# Patient Record
Sex: MALE | Race: WHITE
[De-identification: names, ages, dates, MRNs, and addresses within clinical notes are randomized per-mention and may not be internally consistent; named-entity substitution may affect disease eponyms.]

---

## 2018-06-28 NOTE — EMERGENCY ROOM REPORT
History of Present Illness


General


Chief Complaint:  Dizziness


Source:  Patient





Present Illness


HPI


68-year-old male presents ED complaining of dizziness and hot flashes.  Started 

today at home.  Call 911.  Upon arrival patient states he is feeling somewhat 

better.  Notes history of prostate cancer.  Notes history of anxiety and 

hypertension.  States he could've been anxious.  Denies chest pain or shortness 

of breath.  Denies any headaches blurry vision or nausea or vomiting.  No other 

aggravating relieving factors.  Denies any other associated symptoms


Allergies:  


Coded Allergies:  


     No Known Allergies (Unverified , 6/28/18)





Patient History


Past Medical History:  HTN, psych hx, other - prostate cancer


Past Surgical History:  none


Pertinent Family History:  none


Social History:  Denies: smoking, alcohol use, drug use


Immunizations:  UTD


Reviewed Nursing Documentation:  PMH: Agreed; PSxH: Agreed





Nursing Documentation-PMH


Past Medical History:  No History, Except For


Hx Hypertension:  Yes


Hx Cancer:  Yes - Prostate


History Of Psychiatric Problem:  Yes - Anxiety





Review of Systems


All Other Systems:  negative except mentioned in HPI





Physical Exam





Vital Signs








  Date Time  Temp Pulse Resp B/P (MAP) Pulse Ox O2 Delivery O2 Flow Rate FiO2


 


6/28/18 12:53 97.9 65 18 137/78 99 Room Air  





 97.9       








Sp02 EP Interpretation:  reviewed, normal


General Appearance:  no apparent distress, alert, GCS 15, non-toxic


Head:  normocephalic, atraumatic


Eyes:  bilateral eye normal inspection, bilateral eye PERRL


ENT:  hearing grossly normal, normal pharynx, no angioedema, normal voice


Neck:  full range of motion, supple/symm/no masses


Respiratory:  chest non-tender, lungs clear, normal breath sounds, speaking 

full sentences


Cardiovascular #1:  regular rate, rhythm, no edema


Cardiovascular #2:  2+ carotid (R), 2+ carotid (L), 2+ radial (R), 2+ radial (L)

, 2+ dorsalis pedis (R), 2+ dorsalis pedis (L)


Gastrointestinal:  normal bowel sounds, non tender, soft, non-distended, no 

guarding, no rebound


Rectal:  deferred


Genitourinary:  normal inspection, no CVA tenderness


Musculoskeletal:  back normal, gait/station normal, normal range of motion, non-

tender


Neurologic:  alert, oriented x3, responsive, motor strength/tone normal, 

sensory intact, speech normal


Psychiatric:  judgement/insight normal, memory normal, mood/affect normal, no 

suicidal/homicidal ideation


Reflexes:  3+ bicep (R), 3+ bicep (L), 3+ tricep (R), 3+ tricep (L), 3+ knee (R)

, 3+ knee (L)


Skin:  normal color, no rash, warm/dry, well hydrated


Lymphatic:  no adenopathy





Medical Decision Making


Diagnostic Impression:  


 Primary Impression:  


 Dizziness


 Additional Impression:  


 Anxiety


ER Course


Hospital Course 


69 yo M presents to ED c/o dizziness, hot flashes





Differential diagnoses include: afib, Vtach, SVT, anxiety, dehydration





Clinical course


Patient placed on stretcher.  After initial history and physical I ordered labs

, EKG, ativan, IVFs.  





labs reviewed- all electrolytes normal, troponins negative, noted leukocytosis, 

hemoglobin/hematocrit stable


EKG - NSr, no acute ischeimc changes interpreted by me





Upon reassessment patient states symptoms have improved.  Reassurance given.  

Possibly anxiety related.  Recommend close follow-up with PMD





I.  I feel this is a highly complex case requiring extensive working including 

EKG/Rhythm strip, Xray/CT/US, Blood/urine lab work, repeat exams while in ED, 

and administration of strong opiates/narcotics for pain control, admission to 

hospital or close patient follow up.  





Diagnosis - dizziness, anxiety





Stable and discharged to home.  Instructed to followup with PMD.  Return to ED 

if symptoms recur or worsen





Labs








Test


  6/28/18


13:45


 


White Blood Count


  13.6 K/UL


(4.8-10.8)


 


Red Blood Count


  4.73 M/UL


(4.70-6.10)


 


Hemoglobin


  14.7 G/DL


(14.2-18.0)


 


Hematocrit


  43.5 %


(42.0-52.0)


 


Mean Corpuscular Volume 92 FL (80-99) 


 


Mean Corpuscular Hemoglobin


  31.1 PG


(27.0-31.0)


 


Mean Corpuscular Hemoglobin


Concent 33.8 G/DL


(32.0-36.0)


 


Red Cell Distribution Width


  12.9 %


(11.6-14.8)


 


Platelet Count


  241 K/UL


(150-450)


 


Mean Platelet Volume


  8.2 FL


(6.5-10.1)


 


Neutrophils (%) (Auto)  % (45.0-75.0) 


 


Lymphocytes (%) (Auto)  % (20.0-45.0) 


 


Monocytes (%) (Auto)  % (1.0-10.0) 


 


Eosinophils (%) (Auto)  % (0.0-3.0) 


 


Basophils (%) (Auto)  % (0.0-2.0) 


 


Differential Total Cells


Counted 100 


 


 


Neutrophils % (Manual) 91 % (45-75) 


 


Lymphocytes % (Manual) 5 % (20-45) 


 


Monocytes % (Manual) 4 % (1-10) 


 


Eosinophils % (Manual) 0 % (0-3) 


 


Basophils % (Manual) 0 % (0-2) 


 


Band Neutrophils 0 % (0-8) 


 


Platelet Estimate Adequate 


 


Platelet Morphology Normal 


 


Red Blood Cell Morphology Normal 


 


Sodium Level


  141 MMOL/L


(136-145)


 


Potassium Level


  3.8 MMOL/L


(3.5-5.1)


 


Chloride Level


  108 MMOL/L


()


 


Carbon Dioxide Level


  22 MMOL/L


(21-32)


 


Anion Gap


  11 mmol/L


(5-15)


 


Blood Urea Nitrogen


  18 mg/dL


(7-18)


 


Creatinine


  1.1 MG/DL


(0.55-1.30)


 


Estimat Glomerular Filtration


Rate > 60 mL/min


(>60)


 


Glucose Level


  134 MG/DL


()


 


Calcium Level


  9.3 MG/DL


(8.5-10.1)


 


Total Bilirubin


  0.5 MG/DL


(0.2-1.0)


 


Aspartate Amino Transf


(AST/SGOT) 19 U/L (15-37) 


 


 


Alanine Aminotransferase


(ALT/SGPT) 25 U/L (12-78) 


 


 


Alkaline Phosphatase


  84 U/L


()


 


Troponin I


  0.000 ng/mL


(0.000-0.056)


 


Total Protein


  8.0 G/DL


(6.4-8.2)


 


Albumin


  4.2 G/DL


(3.4-5.0)


 


Globulin 3.8 g/dL 


 


Albumin/Globulin Ratio 1.1 (1.0-2.7) 








EKG Diagnostic Results


Rate:  normal


Rhythm:  NSR


ST Segments:  no acute changes


ASA given to the pt in ED:  No





Rhythm Strip Diag. Results


EP Interpretation:  yes


Rhythm:  NSR, no PVC's, no ectopy





Last Vital Signs








  Date Time  Temp Pulse Resp B/P (MAP) Pulse Ox O2 Delivery O2 Flow Rate FiO2


 


6/28/18 15:06 98.9 71 16 141/67  Room Air  


 


6/28/18 13:10     99   








Status:  improved


Disposition:  HOME, SELF-CARE


Condition:  Stable


Referrals:  


[facility]ERR[facility] (PCP)


Patient Instructions:  Saman Galeano MD Jun 28, 2018 16:12

## 2018-06-29 NOTE — CARDIOLOGY REPORT
--------------- APPROVED REPORT --------------





EKG Measurement

Heart [id number]

MT [id number]

[id number]

[id number]

[id number]





Normal sinus rhythm

Normal ECG

## 2021-03-01 ENCOUNTER — HOSPITAL ENCOUNTER (EMERGENCY)
Dept: HOSPITAL 72 - EMR | Age: 71
Discharge: HOME | End: 2021-03-01
Payer: SELF-PAY

## 2021-03-01 VITALS — SYSTOLIC BLOOD PRESSURE: 138 MMHG | DIASTOLIC BLOOD PRESSURE: 60 MMHG

## 2021-03-01 VITALS — BODY MASS INDEX: 29.35 KG/M2 | HEIGHT: 70 IN | WEIGHT: 205 LBS

## 2021-03-01 DIAGNOSIS — S99.912A: Primary | ICD-10-CM
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
## 2021-03-01 PROCEDURE — 29515 APPLICATION SHORT LEG SPLINT: CPT

## 2021-03-01 PROCEDURE — 99284 EMERGENCY DEPT VISIT MOD MDM: CPT

## 2021-03-01 NOTE — DIAGNOSTIC IMAGING REPORT
. Indication: Left foot pain

 

Technique: 3 views left foot

 

Comparison: none

 

Findings: There is a transverse minimally displaced fracture of the base of the fifth

metatarsal. No other acute fracture. No dislocation.

 

Impression: Positive for fifth metatarsal fracture

## 2021-03-01 NOTE — DIAGNOSTIC IMAGING REPORT
Indication: Left ankle pain

 

Technique: 3 views of the left ankle

 

Comparison: none 

 

Findings: There is a slightly distracted fracture of the base of the fifth

metatarsal. There is a small calcaneal spur. No ankle fracture demonstrated.

 

Impression: Positive for fifth metatarsal fracture